# Patient Record
Sex: FEMALE | Race: WHITE
[De-identification: names, ages, dates, MRNs, and addresses within clinical notes are randomized per-mention and may not be internally consistent; named-entity substitution may affect disease eponyms.]

---

## 2019-06-21 NOTE — ULT
OB ULTRASOUND:

 

HISTORY:

A 24-year-old pregnant female.  Evaluate size, dates, and fetal anatomy.

 

COMPARISON:

None.

 

TECHNIQUE:

Multiplanar gray-scale and color Doppler images were obtained in a transabdominal fetal ultrasound.

 

FINDINGS:

There is a single live intrauterine pregnancy with a heart rate of 142 beats per minute.  A fetal wiliam
vey was performed.  The patient has bilateral choroid plexus cysts.  The other intracranial structure
s are unremarkable.  The heart, stomach, umbilical cord, umbilical cord insertion, kidneys, face, and
 extremities are unremarkable.  The average age of the fetus, based off today's examination, is 20 we
eks 3 days.  The following measurements were taken and dates based off these measurements are as foll
ows:

 

BPD:  4.74 cm (20 weeks 3 days).

HC:  17.49 cm (20 weeks 1 day).

AC:  15.44 cm (20 weeks 5 days).

FL:   3.18 cm (20 weeks 0 days).

 

The placenta is posterior in location without evidence of placenta previa.  CAITLIN is 9.73 cm, which is 
normal.

 

IMPRESSION:

1.  Live intrauterine pregnancy, with estimated age 20 weeks 3 days.

 

2.  Bilateral choroid plexus cysts.  Although this is most likely benign, bilateral choroid plexus cy
sts can be a sign of a genetic abnormality.  Recommend followup with 3D ultrasound and chromosomal an
alysis.

 

POS: [facility]

## 2019-07-06 ENCOUNTER — HOSPITAL ENCOUNTER (OUTPATIENT)
Dept: HOSPITAL 92 - L&D/OP | Age: 24
Discharge: HOME | End: 2019-07-06
Attending: OBSTETRICS & GYNECOLOGY
Payer: MEDICAID

## 2019-07-06 VITALS — BODY MASS INDEX: 26.1 KG/M2

## 2019-07-06 VITALS — TEMPERATURE: 97.7 F | DIASTOLIC BLOOD PRESSURE: 58 MMHG | SYSTOLIC BLOOD PRESSURE: 104 MMHG

## 2019-07-06 DIAGNOSIS — O23.42: Primary | ICD-10-CM

## 2019-07-06 DIAGNOSIS — F32.9: ICD-10-CM

## 2019-07-06 DIAGNOSIS — O99.342: ICD-10-CM

## 2019-07-06 DIAGNOSIS — J45.909: ICD-10-CM

## 2019-07-06 DIAGNOSIS — Z3A.22: ICD-10-CM

## 2019-07-06 DIAGNOSIS — Z79.2: ICD-10-CM

## 2019-07-06 DIAGNOSIS — F41.9: ICD-10-CM

## 2019-07-06 DIAGNOSIS — O99.512: ICD-10-CM

## 2019-07-06 DIAGNOSIS — Z91.14: ICD-10-CM

## 2019-07-06 DIAGNOSIS — Z91.018: ICD-10-CM

## 2019-07-06 DIAGNOSIS — Z91.040: ICD-10-CM

## 2019-07-06 LAB
BACTERIA UR QL AUTO: (no result) HPF
PROT UR STRIP.AUTO-MCNC: 50 MG/DL
RBC UR QL AUTO: (no result) HPF (ref 0–3)

## 2019-07-06 PROCEDURE — 87086 URINE CULTURE/COLONY COUNT: CPT

## 2019-07-06 PROCEDURE — 76856 US EXAM PELVIC COMPLETE: CPT

## 2019-07-06 PROCEDURE — 81001 URINALYSIS AUTO W/SCOPE: CPT

## 2019-07-06 NOTE — ULT
PELVIC ULTRASOUND:

 

CLINICAL HISTORY: 

Evaluation of cervical length.

 

FINDINGS: 

Limited sonographic imaging of the pelvis localized to the cervix reveals a cervical length of approx
imately 3.3 cm.

 

The imaged fetus demonstrates fetal cardiac activity at 123 b.p.m.  The sonographer notes motion duri
ng the exam.  The fetus is in a vertex lie on the provided views.

 

IMPRESSION: 

Cervical length, 3.3 cm, average.

 

POS: [facility]

## 2019-07-06 NOTE — PDOC.EVN
Event Note





- Event Note


Event Note: 





Per US tech report cervical length 3.1 to 3.5 cm, good fluid, fetal movement, 

no other abnormal findings. UA consistent with UTI. Discussed results w/ 

patient. will give rocephin here with 5 day po abx completion. Discussed 

adequate fluid hydration. Will f/u on sensitivities.

## 2019-07-06 NOTE — PDOC.LDHP
Labor and Delivery H&P


Chief complaint: abdominal pain


HPI: 





23 yo  at 22.6 here for lower abdominal pain. Started around midnight, 

stays in suprapubic region. Was seen in ER on  for similar sxs in which she 

was diagnosed with UTI and given keflex. Used old keflex from prior UTI and has 

been taking once daily as opposed to twice daily. This morning pain was so bad 

she came here. Denies, n/v, fevers, chills, hematuria, flank pain. Has had two 

uncomplicated UTIs in pregnancy thus far. Reports urine is usually dark and 

drinks 2-3 standard bottles water/day. Denies VB/VD/LOF/CTX.


Current gestational age (weeks): 22 (22.6)


Due date: 19


Dating criteria: first trimester ultrasound


Grav: 2


Para: 1


OB History Details: 





1. term 


Current pregnancy complications: none


Past Medical History: 





asthma, anxiety/depression, hx of UTI in pregnancy


Current medications: pre- vitamins


Allergies/Adverse Reactions: 


 Allergies











Allergy/AdvReac Type Severity Reaction Status Date / Time


 


avocado Allergy Intermediate Swollen Verified 10/17/16 18:57





   Lips  


 


banana Allergy Intermediate Swollen Verified 10/17/16 18:57





   Lips  


 


kiwi Allergy   Verified 10/17/16 18:57


 


Latex, Natural Rubber Allergy   Verified 19 03:56











Social history: none





- Physical Exam


Vital signs reviewed and normal: yes


General: NAD, resting


Heart: RRR


Lungs: CTAB


Abdomen: gravid (suprapubic TTP, no flank tenderness)


Extremeties: no edema





- OB Labs


Blood type: unknown


RH: unknown


Antibody Screen: unknown


HIV: unknown


RPR: unknown


HEPSAg: unknown


1 hour GCT: unknown


GBS: unknown


Urine drug screen: not done





- Plan


-: 








23 yo  at 22.6 by reported 1T sono





1. sIUP, pre term 


-difficult to  FHT due to gestational age but mom reports >8 movements/hr

;no CTX on toco


-no labor sxs at this time


-sees Dr. Crisostomo





2. Dysuria


-Concern for UTI in light of medication noncompliance


-UA/UCx





3. Hx of UTI in pregnancy


-encouraged inc. water intake on daily basis for prevention of UTIs


-discussed can take cranberry juice





4. Anxiety/depression


-stable, continue lexapro





5. Asthma


-albuterol INH PRN, stable





Discussed w/ Dr. Boyd

## 2019-10-11 ENCOUNTER — HOSPITAL ENCOUNTER (OUTPATIENT)
Dept: HOSPITAL 92 - L&D/OP | Age: 24
Discharge: HOME | End: 2019-10-11
Attending: OBSTETRICS & GYNECOLOGY
Payer: COMMERCIAL

## 2019-10-11 VITALS — DIASTOLIC BLOOD PRESSURE: 60 MMHG | SYSTOLIC BLOOD PRESSURE: 104 MMHG | TEMPERATURE: 97.7 F

## 2019-10-11 VITALS — BODY MASS INDEX: 31.7 KG/M2

## 2019-10-11 DIAGNOSIS — Z91.048: ICD-10-CM

## 2019-10-11 DIAGNOSIS — O99.513: ICD-10-CM

## 2019-10-11 DIAGNOSIS — Z79.899: ICD-10-CM

## 2019-10-11 DIAGNOSIS — O47.03: Primary | ICD-10-CM

## 2019-10-11 DIAGNOSIS — Z86.19: ICD-10-CM

## 2019-10-11 DIAGNOSIS — J45.909: ICD-10-CM

## 2019-10-11 DIAGNOSIS — Z91.040: ICD-10-CM

## 2019-10-11 DIAGNOSIS — Z91.018: ICD-10-CM

## 2019-10-11 DIAGNOSIS — O99.343: ICD-10-CM

## 2019-10-11 DIAGNOSIS — F41.9: ICD-10-CM

## 2019-10-11 DIAGNOSIS — Z3A.36: ICD-10-CM

## 2019-10-11 DIAGNOSIS — F32.9: ICD-10-CM

## 2019-10-11 LAB — AMNISURE INTERNAL CONTROL QC: (no result)

## 2019-10-11 PROCEDURE — 84112 EVAL AMNIOTIC FLUID PROTEIN: CPT

## 2019-10-11 NOTE — PDOC.FPROB
FMR OB H&P: HPI





- History of Present Illness


Chief Complaint: contractions & possible LOF


Indentification:  @ 36.5 weeks


History of Present Illness: 





23YO  @ 36.5 weeks with an EDC of 11/3/19 who presented to L&D with a CC 

of contractions that began around ~17:45 this evening while she was at work. 

The patient reports she was standing at the register at work and had sudden 

onset lower abdominal pain that she felt like were contractions. She states the 

contractions were about 15 minutes apart and she may have had a small amount of 

leakage of fluid at the time so decided to come to L&D for evaluation. She 

denies any vaginal bleeding or abnormal discharge & endorses regular fetal 

movement. She states she was last sexually active ~2 days ago. 


Primary Care Physician: 





Kranthi





FMR OB H&P: Current Pregnancy





- Prenatal Care


: 2


Para: 1001


Gestational age: 36.5 weeks 


Due date: 11/3/19





- OB Labs


Blood type: B


RH: positive


GBS: unknown





FMR OB H&P: History





- Past Medical History


PMH: 





anxiety, depression, asthma





- OB History


OB History: 





1 term 





- GYN History


GYN History: 





h/o chlamydia in last pregnancy


h/o abnormal Pap & HPV, last pap last year WNLs per patient





- Surgical History


Sx History: 





cholecystectomy





- Social History


Social History: 





no TAD





- Family History


Family History: 





non-contributory





FMR OB H&P: Medications





- Current


Home Medications: 


 











 Medication  Instructions  Recorded  Confirmed  Type


 


Albuterol Sulfate [Albuterol 8.5 gm IH PRN PRN 08/14/13 10/11/19 History





Sulfate HFA]    


 


Prenatal Vitamin 1 tablet PO DAILY 06/13/16 10/11/19 History


 


Escitalopram Oxalate [Lexapro] 10 mg PO DAILY 10/11/19 10/11/19 History











Allergies/Adverse Reactions: 


 Allergies











Allergy/AdvReac Type Severity Reaction Status Date / Time


 


avocado Allergy Intermediate Swollen Verified 10/11/19 20:52





   Lips  


 


banana Allergy Intermediate Swollen Verified 10/11/19 20:52





   Lips  


 


Latex, Natural Rubber Allergy Intermediate Hives Verified 10/11/19 20:52


 


kiwi Allergy   Verified 10/11/19 20:52














FMR OB H&P: ROS





- Review of Systems


General: denies: fever/chills, recent trauma


Eyes: denies: vision changes, double vision


ENT: denies: nasal congestion, ear pain, sore throat


Cardiovascular: denies: chest pain, palpitation


Respiratory: denies: cough, shortness of breath


Gastrointestinal: denies: abdominal pain, nausea, vomiting, diarrhea, 

constipation


Genitourinary (Female): reports: contractions.  denies: dysuria, hematuria, 

vaginal discharge, vaginal pain, vaginal bleeding, vaginal pressure


Musculoskeletal: denies: pain, swelling


Neurologic: reports: headache.  denies: syncope


Integumentary: denies: itching, rash


Breast: denies: skin changes, pain/tenderness


Endocrine: denies: polydipsia, polyuria


Hematologic/Lymphatic: denies: prolonged or excessive bleeding


Psychological: reports: depression, anxiety





FMR OB H&P: Vital Signs





- Maternal


Vital signs: 


 Vital Signs - First Documented











Temp Pulse Resp BP Pulse Ox


 


 97.7 F   74   18   104/60   98 


 


 10/11/19 20:51  10/11/19 20:51  10/11/19 20:51  10/11/19 20:51  10/11/19 20:51














- Fetal Heart Tones


Baseline: 120


Variability: moderate


Acceleration: present


Deceleration: absent


Toston contractions every: no regular contractions noted





FMR OB H&P: Physical Exam





- Physical Exam


General: NAD, awake, alert and oriented


HEENT: normocephalic and atraumatic, conjunctiva clear, grossly normal vision, 

grossly normal hearing


Neck: supple, FROM


Heart: RRR, normal S1/S2, pulses present, no edema


General: CTAB, no respiratory distress, good air movement, no rales/rhonchi, no 

wheezing, no retractions


Abdomen: soft, gravid, non-tender, bowel sound present


Musculoskeletal: normal gait and station, FROM in all four extremities


Neurological: cranial nerves II through XII intact, sensation to pain,touch and 

proprioception grossly normal, no focal deficit


Skin: no rash, good tugor


Lymphatic: no unusual bruising or bleeding, no purpura, no petechia


Psychiatric: intact recent and remote memory, good judgement and insight, 

normal mood and affect





- Pelvic Exam


SVE: /High





FMR OB H&P: A/P





- Problem List


(1) Single pregnancy in third trimester


Status: Acute   Code(s): Z34.93 - ENCNTR FOR SUPRVSN OF NORMAL PREG, UNSP, 

THIRD TRIMESTER   





(2) History of chlamydia infection


Status: Acute   Code(s): Z86.19 - PERSONAL HISTORY OF OTHER INFECTIOUS AND 

PARASITIC DISEASES   


Disposition: 





23YO  @ 36.5 weeks w/ an EDC of 11/3/19 who presented to L&D for 

evaluation of contractions & possible LOF.





Lower abdominal pain/contractions, r/o labor & ROM:


- No contractions noted on toco since arrival & FHTs reassuring with baseline 

in 120s & moderate variability.


- Amnisure swab obtained which was negative. SVE /High.


- Most likely Jose Alfredo Gupta contractions that have now resolved. 








Dispo:  Patient and fetus stable with no signs of imminent labor or need for 

further monitoring. Will d/c home with instructions to f/u with PCP for 

regularly scheduled appointments. 


Discussion: 


Date/Time: 10/11/19 2123











This H&P was discussed with  [] and  [] who agree with the above 

documentation and plan.








Addendum - Attending





- Attending Attestation


Date/Time: 10/14/19 691





I personally evaluated the patient and discussed the management with Dr. Myers


I agree with the History, Examination, Assessment and Plan documented above 

with any addition or exceptions noted below.


Vital signs normal and stable. no evidence of labor. Pt discharged home with 

 labor precautions and instructions to f/u with primary ob as scheduled.

## 2019-10-27 ENCOUNTER — HOSPITAL ENCOUNTER (INPATIENT)
Dept: HOSPITAL 92 - L&D | Age: 24
LOS: 3 days | Discharge: HOME | End: 2019-10-30
Attending: OBSTETRICS & GYNECOLOGY | Admitting: OBSTETRICS & GYNECOLOGY
Payer: COMMERCIAL

## 2019-10-27 VITALS — BODY MASS INDEX: 30.7 KG/M2

## 2019-10-27 DIAGNOSIS — Z3A.39: ICD-10-CM

## 2019-10-27 DIAGNOSIS — Z23: ICD-10-CM

## 2019-10-27 LAB
HGB BLD-MCNC: 12 G/DL (ref 12–16)
MCH RBC QN AUTO: 30.6 PG (ref 27–31)
MCV RBC AUTO: 87.1 FL (ref 78–98)
PLATELET # BLD AUTO: 151 THOU/UL (ref 130–400)
RBC # BLD AUTO: 3.91 MILL/UL (ref 4.2–5.4)
SYPHILIS ANTIBODY INDEX: 0.07 S/CO
WBC # BLD AUTO: 13.9 THOU/UL (ref 4.8–10.8)

## 2019-10-27 PROCEDURE — 87340 HEPATITIS B SURFACE AG IA: CPT

## 2019-10-27 PROCEDURE — 85027 COMPLETE CBC AUTOMATED: CPT

## 2019-10-27 PROCEDURE — 51702 INSERT TEMP BLADDER CATH: CPT

## 2019-10-27 PROCEDURE — 86780 TREPONEMA PALLIDUM: CPT

## 2019-10-27 PROCEDURE — 86901 BLOOD TYPING SEROLOGIC RH(D): CPT

## 2019-10-27 PROCEDURE — 36415 COLL VENOUS BLD VENIPUNCTURE: CPT

## 2019-10-27 PROCEDURE — 86900 BLOOD TYPING SEROLOGIC ABO: CPT

## 2019-10-27 PROCEDURE — 86850 RBC ANTIBODY SCREEN: CPT

## 2019-10-27 RX ADMIN — SODIUM CHLORIDE SCH: 0.9 INJECTION, SOLUTION INTRAVENOUS at 21:15

## 2019-10-28 LAB — HBSAG INDEX: 0.13 S/CO (ref 0–0.99)

## 2019-10-28 PROCEDURE — 3E033VJ INTRODUCTION OF OTHER HORMONE INTO PERIPHERAL VEIN, PERCUTANEOUS APPROACH: ICD-10-PCS | Performed by: OBSTETRICS & GYNECOLOGY

## 2019-10-28 PROCEDURE — 0HQ9XZZ REPAIR PERINEUM SKIN, EXTERNAL APPROACH: ICD-10-PCS | Performed by: OBSTETRICS & GYNECOLOGY

## 2019-10-28 PROCEDURE — 10907ZC DRAINAGE OF AMNIOTIC FLUID, THERAPEUTIC FROM PRODUCTS OF CONCEPTION, VIA NATURAL OR ARTIFICIAL OPENING: ICD-10-PCS | Performed by: OBSTETRICS & GYNECOLOGY

## 2019-10-28 PROCEDURE — 10J1XZZ INSPECTION OF PRODUCTS OF CONCEPTION, RETAINED, EXTERNAL APPROACH: ICD-10-PCS | Performed by: OBSTETRICS & GYNECOLOGY

## 2019-10-28 RX ADMIN — SODIUM CHLORIDE SCH MLS: 0.9 INJECTION, SOLUTION INTRAVENOUS at 11:31

## 2019-10-28 RX ADMIN — DOCUSATE CALCIUM SCH MG: 240 CAPSULE, LIQUID FILLED ORAL at 21:38

## 2019-10-28 RX ADMIN — HYDROCODONE BITARTRATE AND ACETAMINOPHEN PRN TAB: 5; 325 TABLET ORAL at 21:38

## 2019-10-29 LAB
HGB BLD-MCNC: 10.3 G/DL (ref 12–16)
MCH RBC QN AUTO: 27.8 PG (ref 27–31)
MCV RBC AUTO: 86.8 FL (ref 78–98)
PLATELET # BLD AUTO: 137 THOU/UL (ref 130–400)
RBC # BLD AUTO: 3.7 MILL/UL (ref 4.2–5.4)
WBC # BLD AUTO: 12.4 THOU/UL (ref 4.8–10.8)

## 2019-10-29 PROCEDURE — 3E0234Z INTRODUCTION OF SERUM, TOXOID AND VACCINE INTO MUSCLE, PERCUTANEOUS APPROACH: ICD-10-PCS | Performed by: OBSTETRICS & GYNECOLOGY

## 2019-10-29 RX ADMIN — DOCUSATE CALCIUM SCH MG: 240 CAPSULE, LIQUID FILLED ORAL at 21:58

## 2019-10-29 RX ADMIN — CEFAZOLIN SODIUM SCH MLS: 2 SOLUTION INTRAVENOUS at 03:29

## 2019-10-29 RX ADMIN — CEFAZOLIN SODIUM SCH MLS: 2 SOLUTION INTRAVENOUS at 20:01

## 2019-10-29 RX ADMIN — DOCUSATE CALCIUM SCH MG: 240 CAPSULE, LIQUID FILLED ORAL at 09:39

## 2019-10-29 RX ADMIN — HYDROCODONE BITARTRATE AND ACETAMINOPHEN PRN TAB: 5; 325 TABLET ORAL at 03:27

## 2019-10-29 RX ADMIN — CEFAZOLIN SODIUM SCH MLS: 2 SOLUTION INTRAVENOUS at 12:20

## 2019-10-30 VITALS — TEMPERATURE: 98 F | SYSTOLIC BLOOD PRESSURE: 117 MMHG | DIASTOLIC BLOOD PRESSURE: 66 MMHG

## 2019-10-30 RX ADMIN — CEFAZOLIN SODIUM SCH MLS: 2 SOLUTION INTRAVENOUS at 03:34

## 2019-10-30 RX ADMIN — CEFAZOLIN SODIUM SCH MLS: 2 SOLUTION INTRAVENOUS at 12:30

## 2019-11-08 ENCOUNTER — HOSPITAL ENCOUNTER (EMERGENCY)
Dept: HOSPITAL 92 - ERS | Age: 24
Discharge: HOME | End: 2019-11-08
Payer: COMMERCIAL

## 2019-11-08 DIAGNOSIS — M79.662: Primary | ICD-10-CM

## 2019-11-08 DIAGNOSIS — F41.9: ICD-10-CM

## 2019-11-08 DIAGNOSIS — F32.9: ICD-10-CM

## 2019-11-08 DIAGNOSIS — J45.909: ICD-10-CM

## 2019-11-08 NOTE — ULT
ULTRASOUND WITH DOPPLER DUPLEX VENOUS LOWER EXTREMITY LEFT:

 

CPT: 61691

ICD-10-PCS: B54D

 

INDICATION:

Pain. 

 

TECHNIQUE:

Color flow Doppler, spectral waveform analysis of pulsed Doppler, and gray-scale imaging with kamaljit
tammi and augmentation, were used to evaluate the left common femoral, femoral, popliteal, posterior t
ibial, and superficial femoral, veins; and the proximal portions of the profunda femoral and greater 
saphenous, veins.

 

FINDINGS:

There is appropriate compressibility and flow within the imaged deep vein system of the left lower ex
tremity without evidence of thrombus. 

 

IMPRESSION:

No deep venous thrombosis. 

 

 

POS: [facility]

## 2019-11-23 ENCOUNTER — HOSPITAL ENCOUNTER (EMERGENCY)
Dept: HOSPITAL 92 - ERS | Age: 24
Discharge: HOME | End: 2019-11-23
Payer: COMMERCIAL

## 2019-11-23 DIAGNOSIS — J06.9: Primary | ICD-10-CM

## 2019-11-23 DIAGNOSIS — J45.909: ICD-10-CM

## 2019-11-23 PROCEDURE — 71045 X-RAY EXAM CHEST 1 VIEW: CPT

## 2019-11-23 PROCEDURE — 87804 INFLUENZA ASSAY W/OPTIC: CPT

## 2019-11-23 NOTE — RAD
XR Chest 1 View Portable



History: Pneumonia



Comparison: None.



Findings: Lungs are clear. No pneumothorax. No effusion. No acute osseous abnormality.



Impression: No acute intrathoracic abnormality.



Reported By: Puma Braun 

Electronically Signed:  11/23/2019 8:30 PM